# Patient Record
Sex: MALE | Race: WHITE | Employment: UNEMPLOYED | URBAN - METROPOLITAN AREA
[De-identification: names, ages, dates, MRNs, and addresses within clinical notes are randomized per-mention and may not be internally consistent; named-entity substitution may affect disease eponyms.]

---

## 2017-09-11 DIAGNOSIS — E88.81 METABOLIC SYNDROME: ICD-10-CM

## 2020-01-20 ENCOUNTER — APPOINTMENT (OUTPATIENT)
Dept: CT IMAGING | Age: 35
End: 2020-01-20
Payer: MEDICARE

## 2020-01-20 ENCOUNTER — HOSPITAL ENCOUNTER (EMERGENCY)
Age: 35
Discharge: HOME | End: 2020-01-20
Attending: EMERGENCY MEDICINE
Payer: MEDICARE

## 2020-01-20 VITALS
TEMPERATURE: 97.8 F | OXYGEN SATURATION: 98 % | HEIGHT: 65 IN | HEART RATE: 104 BPM | SYSTOLIC BLOOD PRESSURE: 148 MMHG | RESPIRATION RATE: 20 BRPM | WEIGHT: 315 LBS | BODY MASS INDEX: 52.48 KG/M2 | DIASTOLIC BLOOD PRESSURE: 77 MMHG

## 2020-01-20 LAB
A/G RATIO: 1.1 (ref 1.1–2.2)
ALBUMIN SERPL-MCNC: 3.9 G/DL (ref 3.4–5)
ALP BLD-CCNC: 67 U/L (ref 40–129)
ALT SERPL-CCNC: 30 U/L (ref 10–40)
ANION GAP SERPL CALCULATED.3IONS-SCNC: 14 MMOL/L (ref 3–16)
AST SERPL-CCNC: 25 U/L (ref 15–37)
BASOPHILS ABSOLUTE: 0.1 K/UL (ref 0–0.2)
BASOPHILS RELATIVE PERCENT: 0.4 %
BILIRUB SERPL-MCNC: 0.3 MG/DL (ref 0–1)
BILIRUBIN URINE: NEGATIVE
BLOOD, URINE: NEGATIVE
BUN BLDV-MCNC: 14 MG/DL (ref 7–20)
CALCIUM SERPL-MCNC: 9.7 MG/DL (ref 8.3–10.6)
CHLORIDE BLD-SCNC: 103 MMOL/L (ref 99–110)
CLARITY: CLEAR
CO2: 22 MMOL/L (ref 21–32)
COLOR: YELLOW
CREAT SERPL-MCNC: 0.9 MG/DL (ref 0.9–1.3)
EOSINOPHILS ABSOLUTE: 0.1 K/UL (ref 0–0.6)
EOSINOPHILS RELATIVE PERCENT: 0.6 %
GFR AFRICAN AMERICAN: >60
GFR NON-AFRICAN AMERICAN: >60
GLOBULIN: 3.6 G/DL
GLUCOSE BLD-MCNC: 126 MG/DL (ref 70–99)
GLUCOSE URINE: NEGATIVE MG/DL
HCT VFR BLD CALC: 41.2 % (ref 40.5–52.5)
HEMOGLOBIN: 14.1 G/DL (ref 13.5–17.5)
KETONES, URINE: NEGATIVE MG/DL
LEUKOCYTE ESTERASE, URINE: NEGATIVE
LIPASE: 69 U/L (ref 13–60)
LYMPHOCYTES ABSOLUTE: 1.1 K/UL (ref 1–5.1)
LYMPHOCYTES RELATIVE PERCENT: 9.5 %
MCH RBC QN AUTO: 28.6 PG (ref 26–34)
MCHC RBC AUTO-ENTMCNC: 34.2 G/DL (ref 31–36)
MCV RBC AUTO: 83.7 FL (ref 80–100)
MICROSCOPIC EXAMINATION: NORMAL
MONOCYTES ABSOLUTE: 0.9 K/UL (ref 0–1.3)
MONOCYTES RELATIVE PERCENT: 7.1 %
NEUTROPHILS ABSOLUTE: 9.9 K/UL (ref 1.7–7.7)
NEUTROPHILS RELATIVE PERCENT: 82.4 %
NITRITE, URINE: NEGATIVE
PDW BLD-RTO: 12.9 % (ref 12.4–15.4)
PH UA: 6 (ref 5–8)
PLATELET # BLD: 267 K/UL (ref 135–450)
PMV BLD AUTO: 8.5 FL (ref 5–10.5)
POTASSIUM SERPL-SCNC: 4.2 MMOL/L (ref 3.5–5.1)
PROTEIN UA: NEGATIVE MG/DL
RBC # BLD: 4.92 M/UL (ref 4.2–5.9)
SODIUM BLD-SCNC: 139 MMOL/L (ref 136–145)
SPECIFIC GRAVITY UA: >1.03 (ref 1–1.03)
TOTAL PROTEIN: 7.5 G/DL (ref 6.4–8.2)
URINE REFLEX TO CULTURE: NORMAL
URINE TYPE: NORMAL
UROBILINOGEN, URINE: 0.2 E.U./DL
WBC # BLD: 12.1 K/UL (ref 4–11)

## 2020-01-20 PROCEDURE — 80053 COMPREHEN METABOLIC PANEL: CPT

## 2020-01-20 PROCEDURE — 81003 URINALYSIS AUTO W/O SCOPE: CPT

## 2020-01-20 PROCEDURE — 99284 EMERGENCY DEPT VISIT MOD MDM: CPT

## 2020-01-20 PROCEDURE — 6360000004 HC RX CONTRAST MEDICATION: Performed by: PHYSICIAN ASSISTANT

## 2020-01-20 PROCEDURE — 72125 CT NECK SPINE W/O DYE: CPT

## 2020-01-20 PROCEDURE — 6370000000 HC RX 637 (ALT 250 FOR IP): Performed by: PHYSICIAN ASSISTANT

## 2020-01-20 PROCEDURE — 70486 CT MAXILLOFACIAL W/O DYE: CPT

## 2020-01-20 PROCEDURE — 85025 COMPLETE CBC W/AUTO DIFF WBC: CPT

## 2020-01-20 PROCEDURE — 70450 CT HEAD/BRAIN W/O DYE: CPT

## 2020-01-20 PROCEDURE — 83690 ASSAY OF LIPASE: CPT

## 2020-01-20 PROCEDURE — 2500000003 HC RX 250 WO HCPCS: Performed by: PHYSICIAN ASSISTANT

## 2020-01-20 PROCEDURE — 2580000003 HC RX 258: Performed by: PHYSICIAN ASSISTANT

## 2020-01-20 PROCEDURE — 71260 CT THORAX DX C+: CPT

## 2020-01-20 RX ORDER — ONDANSETRON 2 MG/ML
4 INJECTION INTRAMUSCULAR; INTRAVENOUS ONCE
Status: DISCONTINUED | OUTPATIENT
Start: 2020-01-20 | End: 2020-01-20

## 2020-01-20 RX ORDER — TETRACAINE HYDROCHLORIDE 5 MG/ML
1 SOLUTION OPHTHALMIC ONCE
Status: COMPLETED | OUTPATIENT
Start: 2020-01-20 | End: 2020-01-20

## 2020-01-20 RX ORDER — MORPHINE SULFATE 4 MG/ML
4 INJECTION, SOLUTION INTRAMUSCULAR; INTRAVENOUS ONCE
Status: DISCONTINUED | OUTPATIENT
Start: 2020-01-20 | End: 2020-01-20

## 2020-01-20 RX ORDER — LIDOCAINE HYDROCHLORIDE 10 MG/ML
5 INJECTION, SOLUTION INFILTRATION; PERINEURAL ONCE
Status: COMPLETED | OUTPATIENT
Start: 2020-01-20 | End: 2020-01-20

## 2020-01-20 RX ORDER — ACETAMINOPHEN 325 MG/1
650 TABLET ORAL ONCE
Status: COMPLETED | OUTPATIENT
Start: 2020-01-20 | End: 2020-01-20

## 2020-01-20 RX ORDER — ERYTHROMYCIN 5 MG/G
OINTMENT OPHTHALMIC
Qty: 1 TUBE | Refills: 0 | Status: SHIPPED | OUTPATIENT
Start: 2020-01-20 | End: 2020-01-24 | Stop reason: ALTCHOICE

## 2020-01-20 RX ORDER — 0.9 % SODIUM CHLORIDE 0.9 %
1000 INTRAVENOUS SOLUTION INTRAVENOUS ONCE
Status: COMPLETED | OUTPATIENT
Start: 2020-01-20 | End: 2020-01-20

## 2020-01-20 RX ADMIN — TETRACAINE HYDROCHLORIDE 1 DROP: 5 SOLUTION OPHTHALMIC at 20:50

## 2020-01-20 RX ADMIN — ACETAMINOPHEN 650 MG: 325 TABLET ORAL at 19:02

## 2020-01-20 RX ADMIN — FLUORESCEIN SODIUM 1 MG: 1 STRIP OPHTHALMIC at 20:50

## 2020-01-20 RX ADMIN — LIDOCAINE HYDROCHLORIDE 5 ML: 10 INJECTION, SOLUTION INFILTRATION; PERINEURAL at 20:00

## 2020-01-20 RX ADMIN — IOPAMIDOL 75 ML: 755 INJECTION, SOLUTION INTRAVENOUS at 19:55

## 2020-01-20 RX ADMIN — SODIUM CHLORIDE 1000 ML: 9 INJECTION, SOLUTION INTRAVENOUS at 19:02

## 2020-01-20 ASSESSMENT — PAIN DESCRIPTION - DESCRIPTORS
DESCRIPTORS_2: ACHING
DESCRIPTORS: ACHING

## 2020-01-20 ASSESSMENT — ENCOUNTER SYMPTOMS
BACK PAIN: 0
VOMITING: 0
SHORTNESS OF BREATH: 0
EYE PAIN: 0
ABDOMINAL PAIN: 1

## 2020-01-20 ASSESSMENT — PAIN DESCRIPTION - PROGRESSION
CLINICAL_PROGRESSION_2: NOT CHANGED
CLINICAL_PROGRESSION: NOT CHANGED

## 2020-01-20 ASSESSMENT — PAIN DESCRIPTION - INTENSITY: RATING_2: 0

## 2020-01-20 ASSESSMENT — PAIN DESCRIPTION - LOCATION
LOCATION: HEAD
LOCATION: JAW

## 2020-01-20 ASSESSMENT — PAIN SCALES - GENERAL
PAINLEVEL_OUTOF10: 0
PAINLEVEL_OUTOF10: 5
PAINLEVEL_OUTOF10: 2

## 2020-01-20 ASSESSMENT — PAIN DESCRIPTION - ONSET
ONSET_2: OTHER (COMMENT)
ONSET: ON-GOING

## 2020-01-20 ASSESSMENT — PAIN - FUNCTIONAL ASSESSMENT: PAIN_FUNCTIONAL_ASSESSMENT: 0-10

## 2020-01-20 ASSESSMENT — PAIN DESCRIPTION - FREQUENCY: FREQUENCY: CONTINUOUS

## 2020-01-20 ASSESSMENT — PAIN DESCRIPTION - PAIN TYPE: TYPE: ACUTE PAIN

## 2020-01-20 ASSESSMENT — PAIN DESCRIPTION - DURATION: DURATION_2: CONTINUOUS

## 2020-01-20 ASSESSMENT — VISUAL ACUITY
OU: 20/20
OS: 20/20
OD: 20/20

## 2020-01-20 ASSESSMENT — PAIN DESCRIPTION - ORIENTATION
ORIENTATION: RIGHT
ORIENTATION_2: POSTERIOR
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
## 2020-01-22 ENCOUNTER — TELEPHONE (OUTPATIENT)
Dept: FAMILY MEDICINE CLINIC | Age: 35
End: 2020-01-22

## 2020-01-24 ENCOUNTER — OFFICE VISIT (OUTPATIENT)
Dept: FAMILY MEDICINE CLINIC | Age: 35
End: 2020-01-24
Payer: COMMERCIAL

## 2020-01-24 VITALS
DIASTOLIC BLOOD PRESSURE: 84 MMHG | TEMPERATURE: 98.4 F | BODY MASS INDEX: 55.41 KG/M2 | OXYGEN SATURATION: 96 % | WEIGHT: 315 LBS | SYSTOLIC BLOOD PRESSURE: 145 MMHG | HEART RATE: 89 BPM | RESPIRATION RATE: 12 BRPM

## 2020-01-24 PROCEDURE — 99214 OFFICE O/P EST MOD 30 MIN: CPT | Performed by: FAMILY MEDICINE

## 2020-01-24 PROCEDURE — 96160 PT-FOCUSED HLTH RISK ASSMT: CPT | Performed by: FAMILY MEDICINE

## 2020-01-24 RX ORDER — HYDROCODONE BITARTRATE AND ACETAMINOPHEN 5; 325 MG/1; MG/1
1 TABLET ORAL EVERY 8 HOURS PRN
Qty: 9 TABLET | Refills: 0 | Status: SHIPPED | OUTPATIENT
Start: 2020-01-24 | End: 2020-01-27

## 2020-01-24 RX ORDER — PRAZOSIN HYDROCHLORIDE 1 MG/1
1 CAPSULE ORAL NIGHTLY
Qty: 30 CAPSULE | Refills: 0 | Status: SHIPPED | OUTPATIENT
Start: 2020-01-24 | End: 2020-02-13 | Stop reason: SDUPTHER

## 2020-01-24 RX ORDER — HYDROXYZINE HYDROCHLORIDE 10 MG/1
10-20 TABLET, FILM COATED ORAL 3 TIMES DAILY PRN
Qty: 30 TABLET | Refills: 0 | Status: SHIPPED | OUTPATIENT
Start: 2020-01-24 | End: 2020-01-31 | Stop reason: SDUPTHER

## 2020-01-24 ASSESSMENT — PATIENT HEALTH QUESTIONNAIRE - PHQ9
6. FEELING BAD ABOUT YOURSELF - OR THAT YOU ARE A FAILURE OR HAVE LET YOURSELF OR YOUR FAMILY DOWN: 3
5. POOR APPETITE OR OVEREATING: 3
SUM OF ALL RESPONSES TO PHQ QUESTIONS 1-9: 19
SUM OF ALL RESPONSES TO PHQ QUESTIONS 1-9: 19
1. LITTLE INTEREST OR PLEASURE IN DOING THINGS: 3
10. IF YOU CHECKED OFF ANY PROBLEMS, HOW DIFFICULT HAVE THESE PROBLEMS MADE IT FOR YOU TO DO YOUR WORK, TAKE CARE OF THINGS AT HOME, OR GET ALONG WITH OTHER PEOPLE: 2
4. FEELING TIRED OR HAVING LITTLE ENERGY: 3
7. TROUBLE CONCENTRATING ON THINGS, SUCH AS READING THE NEWSPAPER OR WATCHING TELEVISION: 3
SUM OF ALL RESPONSES TO PHQ9 QUESTIONS 1 & 2: 6
9. THOUGHTS THAT YOU WOULD BE BETTER OFF DEAD, OR OF HURTING YOURSELF: 1
8. MOVING OR SPEAKING SO SLOWLY THAT OTHER PEOPLE COULD HAVE NOTICED. OR THE OPPOSITE, BEING SO FIGETY OR RESTLESS THAT YOU HAVE BEEN MOVING AROUND A LOT MORE THAN USUAL: 3
2. FEELING DOWN, DEPRESSED OR HOPELESS: 3
3. TROUBLE FALLING OR STAYING ASLEEP: 3
SUM OF ALL RESPONSES TO PHQ QUESTIONS 1-9: 6
SUM OF ALL RESPONSES TO PHQ QUESTIONS 1-9: 6
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
## 2020-01-31 ENCOUNTER — TELEPHONE (OUTPATIENT)
Dept: PSYCHOLOGY | Age: 35
End: 2020-01-31

## 2020-01-31 ENCOUNTER — OFFICE VISIT (OUTPATIENT)
Dept: FAMILY MEDICINE CLINIC | Age: 35
End: 2020-01-31
Payer: COMMERCIAL

## 2020-01-31 VITALS
HEART RATE: 80 BPM | OXYGEN SATURATION: 97 % | DIASTOLIC BLOOD PRESSURE: 68 MMHG | RESPIRATION RATE: 16 BRPM | BODY MASS INDEX: 56.08 KG/M2 | TEMPERATURE: 97.2 F | WEIGHT: 315 LBS | SYSTOLIC BLOOD PRESSURE: 126 MMHG

## 2020-01-31 DIAGNOSIS — Z11.59 NEED FOR HEPATITIS C SCREENING TEST: ICD-10-CM

## 2020-01-31 DIAGNOSIS — Z11.4 ENCOUNTER FOR SCREENING FOR HIV: ICD-10-CM

## 2020-01-31 LAB
HAV IGM SER IA-ACNC: NORMAL
HEPATITIS B CORE IGM ANTIBODY: NORMAL
HEPATITIS B SURFACE ANTIGEN INTERPRETATION: NORMAL
HEPATITIS C ANTIBODY INTERPRETATION: NORMAL

## 2020-01-31 PROCEDURE — 99214 OFFICE O/P EST MOD 30 MIN: CPT | Performed by: FAMILY MEDICINE

## 2020-01-31 RX ORDER — HYDROXYZINE HYDROCHLORIDE 10 MG/1
10-20 TABLET, FILM COATED ORAL 3 TIMES DAILY PRN
Qty: 60 TABLET | Refills: 0 | Status: SHIPPED | OUTPATIENT
Start: 2020-01-31 | End: 2020-02-10

## 2020-01-31 RX ORDER — CEPHALEXIN 500 MG/1
500 CAPSULE ORAL 2 TIMES DAILY
Qty: 14 CAPSULE | Refills: 0 | Status: SHIPPED | OUTPATIENT
Start: 2020-01-31 | End: 2020-02-14 | Stop reason: ALTCHOICE

## 2020-01-31 NOTE — TELEPHONE ENCOUNTER
Patient is scheduled to see me for a new patient visit on Monday, 2/3. There is a note for the visit that says he prefers early morning appointments. My 8:30 and 9 appointment slots are available. Please call patient to offer one of them to him. Thanks.

## 2020-01-31 NOTE — PATIENT INSTRUCTIONS
1) Increase the bedtime/nighttime prazosin to 2 capsules. Call with update how you are doing with the anxiety and sleep in about 1 week. 2) Start Keflex antibiotics if left elbow redness or pain worsens. 3) Check the cost of the MRI.
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
## 2020-02-01 LAB
HIV AG/AB: NORMAL
HIV ANTIGEN: NORMAL
HIV-1 ANTIBODY: NORMAL
HIV-2 AB: NORMAL

## 2020-02-03 ENCOUNTER — OFFICE VISIT (OUTPATIENT)
Dept: PSYCHOLOGY | Age: 35
End: 2020-02-03
Payer: COMMERCIAL

## 2020-02-03 PROCEDURE — 90791 PSYCH DIAGNOSTIC EVALUATION: CPT | Performed by: PSYCHOLOGIST

## 2020-02-03 ASSESSMENT — PATIENT HEALTH QUESTIONNAIRE - PHQ9
1. LITTLE INTEREST OR PLEASURE IN DOING THINGS: 2
7. TROUBLE CONCENTRATING ON THINGS, SUCH AS READING THE NEWSPAPER OR WATCHING TELEVISION: 3
3. TROUBLE FALLING OR STAYING ASLEEP: 1
6. FEELING BAD ABOUT YOURSELF - OR THAT YOU ARE A FAILURE OR HAVE LET YOURSELF OR YOUR FAMILY DOWN: 2
SUM OF ALL RESPONSES TO PHQ QUESTIONS 1-9: 17
SUM OF ALL RESPONSES TO PHQ QUESTIONS 1-9: 17
SUM OF ALL RESPONSES TO PHQ9 QUESTIONS 1 & 2: 4
5. POOR APPETITE OR OVEREATING: 2
8. MOVING OR SPEAKING SO SLOWLY THAT OTHER PEOPLE COULD HAVE NOTICED. OR THE OPPOSITE, BEING SO FIGETY OR RESTLESS THAT YOU HAVE BEEN MOVING AROUND A LOT MORE THAN USUAL: 3
4. FEELING TIRED OR HAVING LITTLE ENERGY: 2
2. FEELING DOWN, DEPRESSED OR HOPELESS: 2
9. THOUGHTS THAT YOU WOULD BE BETTER OFF DEAD, OR OF HURTING YOURSELF: 0

## 2020-02-03 ASSESSMENT — ANXIETY QUESTIONNAIRES
3. WORRYING TOO MUCH ABOUT DIFFERENT THINGS: 3-NEARLY EVERY DAY
2. NOT BEING ABLE TO STOP OR CONTROL WORRYING: 3-NEARLY EVERY DAY
GAD7 TOTAL SCORE: 20
5. BEING SO RESTLESS THAT IT IS HARD TO SIT STILL: 2-OVER HALF THE DAYS
1. FEELING NERVOUS, ANXIOUS, OR ON EDGE: 3-NEARLY EVERY DAY
4. TROUBLE RELAXING: 3-NEARLY EVERY DAY
7. FEELING AFRAID AS IF SOMETHING AWFUL MIGHT HAPPEN: 3-NEARLY EVERY DAY
6. BECOMING EASILY ANNOYED OR IRRITABLE: 3-NEARLY EVERY DAY
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
## 2020-02-03 NOTE — PATIENT INSTRUCTIONS
Goals: 1. Allow yourself to process the memories of your trauma without avoiding triggers  2. Take your dog for walks to manage stress. When the anxiety sets in, focus on diaphragmatic breathing and grounding. 3. Practice being mindful - paying attention to the present moment on purpose and in a nonjudgmental way        MINDFULNESS    Mindfulness means paying attention in a particular way: on purpose, in the present moment, and non-judgmentally. Gilberto Pollard    \"Mindfulness is the basic human ability to be fully present, aware of where we are and what were doing, and not overly reactive or overwhelmed by whats going on around us. \"   -Mindful Shidler    Paying attention on purpose  Mindfulness involves paying attention on purpose. Mindfulness involves a conscious direction of our awareness. This can mean purposefully directing our attention to the breath, or a particular emotion, or an activity as simple as eating. Doing so allows us to actively shape the mind. Paying attention in the present moment  Left to itself, the mind wanders through all kinds of thoughts -- including thoughts expressing anger, craving, depression, self-pity, and anxiety. As we indulge in these kinds of thoughts, we reinforce those emotions and cause ourselves to suffer. These thoughts usually center around the past or future. But the past no longer exists. The future is just a fantasy until it happens. The one moment we actually can experience -- the present moment -- is the one we seem most to avoid. By purposefully directing our awareness away from thoughts about the past or future and instead towards the [address] - our present moment experience - we decrease the effect of these thoughts on our lives. Paying attention non-judgmentally  Mindfulness is an emotionally non-reactive state. We don't  that this experience is good and that one is bad.  Or, if we do make those judgments, we dont get upset in reaction to our experience. We simply notice it arising, observe it mindfully, and allow it to pass through us. When practicing mindfulness, we may be aware that certain experiences are pleasant and some are unpleasant, but on an emotional level we dont react. Resources  · \"Wherever You Go, There You Are: Mindfulness Meditation in Everyday Life\" - by Ramon Blank  · \"The Miracle of Mindfulness: An Introduction to the Practice of Meditation\" - by Fidelia Moser  · \"The Power of Now: A Guide to Spiritual Enlightenment\" - by Emilie Mcclain  · \"The Mindfulness Solution: Everyday Practices for Everyday Problems\" - by Harvis Heimlich    Ways to Practice Mindfulness  · Pay attention to your breathing  · Take a mindful walk  · Eat mindfully  · Ground yourself in your five senses  · Journal  · Try dishwashing, cleaning and doing laundry a little bit slower than you usually do  · Meditate        Diaphragmatic Breathing    \"The entire autonomic nervous system (and through it, our internal organs and glands) is largely driven by our breathing patterns. By changing our breathing we can influence millions of biochemical reactions in our body, producing more relaxing substances such as endorphins and fewer anxiety-producing ones like adrenaline and higher blood acidity. Mindfulness of the breath is so effective that it is common to all meditative and prayer traditions. \" Anxiety Fear & Breathing - Breathing. com    \"When overcoming high levels of anxiety, it is important to learn the techniques of correct breathing. Many people who live with high levels of anxiety are known to breathe through their chest. Shallow breathing through the chest means you are disrupting the balance of oxygen and carbon dioxide necessary to be in a relaxed state. This type of breathing will perpetuate the symptoms of anxiety. \" HealthyPlace. com      What Is Diaphragmatic Breathing?    Diaphragmatic breathing is a technique that helps you slow down your

## 2020-02-12 NOTE — TELEPHONE ENCOUNTER
Refill is being requested for:  --prazosin (MINIPRESS) 1 MG capsule    (Rationale: Pt states already has appt scheduled for 2/14/20, but is out of medication now. Hoping to have e-scribed to hold until appt.)    Last OV with PCP on:  --1/31/20    Last Labs on:  --1/20/20    Requested Pharmacy:  --[facility] #[id number], [address] [place] [address]. PIETRO Batista Memos [phone] - F [phone]    Does PCP feel refill is appropriate? Pt is requesting return call for update to [phone] when possible. Thank you!

## 2020-02-13 ENCOUNTER — TELEPHONE (OUTPATIENT)
Dept: FAMILY MEDICINE CLINIC | Age: 35
End: 2020-02-13

## 2020-02-13 RX ORDER — PRAZOSIN HYDROCHLORIDE 1 MG/1
1 CAPSULE ORAL NIGHTLY
Qty: 30 CAPSULE | Refills: 0 | Status: SHIPPED | OUTPATIENT
Start: 2020-02-13 | End: 2020-02-14

## 2020-02-14 ENCOUNTER — OFFICE VISIT (OUTPATIENT)
Dept: FAMILY MEDICINE CLINIC | Age: 35
End: 2020-02-14
Payer: COMMERCIAL

## 2020-02-14 VITALS
HEIGHT: 65 IN | DIASTOLIC BLOOD PRESSURE: 62 MMHG | HEART RATE: 78 BPM | RESPIRATION RATE: 16 BRPM | WEIGHT: 315 LBS | SYSTOLIC BLOOD PRESSURE: 122 MMHG | BODY MASS INDEX: 52.48 KG/M2 | OXYGEN SATURATION: 98 %

## 2020-02-14 PROCEDURE — 99214 OFFICE O/P EST MOD 30 MIN: CPT | Performed by: FAMILY MEDICINE

## 2020-02-14 RX ORDER — PRAZOSIN HYDROCHLORIDE 2 MG/1
4 CAPSULE ORAL NIGHTLY
Qty: 60 CAPSULE | Refills: 3 | Status: SHIPPED | OUTPATIENT
Start: 2020-02-14 | End: 2020-06-30

## 2020-02-14 RX ORDER — ESCITALOPRAM OXALATE 10 MG/1
10 TABLET ORAL DAILY
Qty: 30 TABLET | Refills: 5 | Status: SHIPPED | OUTPATIENT
Start: 2020-02-14 | End: 2020-03-03
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
## 2020-02-19 ENCOUNTER — PROCEDURE VISIT (OUTPATIENT)
Dept: AUDIOLOGY | Age: 35
End: 2020-02-19
Payer: MEDICARE

## 2020-02-19 PROBLEM — H93.13 TINNITUS, BILATERAL: Status: ACTIVE | Noted: 2020-02-19

## 2020-02-19 PROCEDURE — 92567 TYMPANOMETRY: CPT | Performed by: AUDIOLOGIST

## 2020-02-19 PROCEDURE — 92557 COMPREHENSIVE HEARING TEST: CPT | Performed by: AUDIOLOGIST

## 2020-02-19 NOTE — PATIENT INSTRUCTIONS
confusion or trouble understanding simple statements. ? Sudden problems with walking or balance. ? A sudden, severe headache that is different from past headaches. Call your doctor now or seek immediate medical care if:    · You develop other symptoms. These may include hearing loss (or worse hearing loss), balance problems, dizziness, nausea, or vomiting. Watch closely for changes in your health, and be sure to contact your doctor if:    · Your tinnitus moves from both ears to one ear. · Your hearing loss gets worse within 1 day after an ear injury. · Your tinnitus or hearing loss does not get better within 1 week after an ear injury. · Your tinnitus bothers you enough that you want to take medicines to help you cope with it. If you notice changes in your tinnitus and/or your hearing, it is recommended that you have your hearing tested by your audiologist and to follow-up with your physician that manages your hearing loss (such as your ENT or Primary Care doctor). Good Communication Strategies    Communication can be challenging for anyone, but can be especially difficult for those with some degree of hearing loss. While we may not be able to control every factor that may lead to difficulty with communication, there are Good Communication Strategies that we can all use in our day-to-day lives. Communication takes both parties working together for it to be successful. Tips as a Listener:   1. Control your environment. It is important to limit the amount of background noise in the room when possible. You should also consider having a good light source in the room to best see the other person. 2. Ask for clarification. Instead of saying \"What?\", you can use parts of what you heard to make a new question. For example, if you heard the word \"Thursday\" but not the rest of the week, you may ask \"What was that about Thursday? \" or \"What did you want to do Thursday? \".   This shows the be especially helpful when watching programs with accented speech. -- Consider use of a sound bar or speakers that come from the front of the TV. With modern flat screen TVs, many of them have speakers that come out of the back of the device, which makes sound bounce off the wall behind it, then go into the room. Sound bars can allow the sound to go straight in your direction and can improve sound quality. -- Consider ear level devices to help improve the volume and/or sound quality of the program.  There are devices that work like headphones that you can adjust the volume for your ears while others can have the volume at a more comfortable level, such as \"TV Ears\". Most hearing aids have devices that allow them to connect directly to the TV and improve sound quality. Noise-Induced Hearing Loss  What it is, and what you can do to prevent it    Exposure to loud sounds, in an occupational setting or recreational, can cause permanent hearing loss. Sound is measured in decibels (dB). Noise-induced hearing loss is the ONLY type of preventable hearing loss. Hearing loss related to noise exposure can occur at any age. There are small sensory cells, called inner and outer hair cells, within the inner ear (cochlea). These cells process the loudness (intensity) and pitch (frequency) of sound and send the signal to the brain via our auditory nerve (vestibulocochlear nerve, cranial nerve VIII). When these cells are damaged, they can result in permanent hearing loss and/or tinnitus. The hair cells responsible for high frequency sounds, like birds chirping, are most likely to be damaged due to loud sounds. The high frequency sounds are also very important for our clarity and understanding of speech. OCCUPATIONAL NOISE EXPOSURE RECREATIONAL NOISE EXPOSURE   Some jobs may have exposure to loud sounds in the workplace.   These jobs may include but are not limited to:  [employer] settings   Manufacturing   Construction   Welding   Landscaping   Hairdressing/hairstyling   Musicians  [employer]    ... And more! Many activities outside of work may cause permanent hearing loss. These activities may include but are not limited to:  Lawnmowers, leaf blowers  [employer] (such as pigs squealing)   Chainsaws and other power tools  [place] musical instruments and/or singing   Listening to music too loudly - at concerts, through stereo, through ear buds or headphones   Attending sporting events   Attending fireworks shows or using fireworks at home  [employer] of firearms   . .. And more! REDUCE OR PROTECT YOUR EARS FROM NOISE EXPOSURE    To do your best to avoid noise-induced hearing loss, here are some tips:   Limit exposure to loud sounds. 85 dB (decibels) is safe for 8 hours. As sounds are louder, the length of time the sound is safe lessens. These numbers are cumulative across a 24-hour period. (NIOSH and CDC, 2002)  o 85 dB is safe for 8 hours  o 88 dB is safe for 4 hours  o 91 dB is safe for 2 hours  o 94 dB is safe for 1 hour  o 97 dB is safe for 30 minutes  o 100 dB is safe for 15 minutes  o 103 dB is safe for 7.5 minutes  o 106 dB is safe for 3.75 minutes  o 109 dB is safe for LESS THAN 2 minutes  o 112 dB is safe for LESS THAN 1 minute  o 115 dB is safe for ~ 30 seconds  o 130 dB can cause IMMEDIATE hearing loss   If you are unsure if a sound is too loud, consider checking the sound level with a \"sound level meter\". There are apps on smart devices, such as \"Decibel X\", that can measure the loudness of the sound. They are not as accurate as expensive equipment used by scientists, but it will give you a guesstimate of how loud the sound is, and if it may be damaging to your hearing.  If you cannot avoid loud sounds, here are ways to reduce your exposure:  o 1.  Wear hearing protection  - Ear plugs and protective ear muffs can be used to reduce the intensity of the sound. The higher the NRR (noise reduction rating), the better reduction of the intensity of the sound   o 2. Turn the volume down  - When listening to music, turn the volume down, especially when wearing ear buds or headphones. A good rule of thumb is to not go beyond the middle setting on your device. If you can't hear someone talking to you from arm's length away, your music may be at a level that it can cause damage. If someone else can hear your music from 3 feet away, it may also be at a level that it can cause damage. o 3. Walk away from the sound  - If you do not have the ability to wear hearing protection or turn down the volume of the sound, you should do your best to move away from the source of the sound. - Sound decreases in intensity as we move further from the source. The sound will decrease by 6 dB for every doubling of distance from the sound source. TYPES OF HEARING PROTECTION    The most common types of hearing protection are protective ear muffs and ear plugs. Protective ear muffs are commonly found at home improvement or sporting good stores, they can be worn time and time again and are great if you need to take your hearing protection off frequently. Ear plugs are often made of foam or soft silicone. The foam ones are designed for one-time use, while silicone ear plugs may be used multiple times. There are also \"filtered\" ear plugs that help provide even attenuation of the sound across all frequencies. These are great for listening to music or going to concerts, and allow for better understanding of speech in louder environments. They can be purchased at music stores or online retailers (search \"Ety Plugs\" or \"filtered ear plugs\"), or custom earmolds can be made with an audiologist.    There are \"custom\" hearing protection devices that you can further discuss with your audiologist based on your specific needs, if desired.         Exposure to these sounds may cause permanent damage to your hearing.   If you suspect your hearing has changed, it is recommended that you have your hearing tested by your audiologist.

## 2020-02-19 NOTE — Clinical Note
Dr. Isac Rios you for your referral for audiometric testing on this patient. Today's results are consistent with hearing sensitivity within normal limits with normal middle ear function in both ears. Discussed tinnitus management strategies. If you have any questions, or if there is anything else you need, please let me know. Teddy Amin [id number] [facility] - Audiology

## 2020-02-27 ENCOUNTER — OFFICE VISIT (OUTPATIENT)
Dept: PSYCHOLOGY | Age: 35
End: 2020-02-27
Payer: MEDICARE

## 2020-02-27 PROCEDURE — 90832 PSYTX W PT 30 MINUTES: CPT | Performed by: PSYCHOLOGIST

## 2020-02-27 ASSESSMENT — ANXIETY QUESTIONNAIRES
5. BEING SO RESTLESS THAT IT IS HARD TO SIT STILL: 1-SEVERAL DAYS
1. FEELING NERVOUS, ANXIOUS, OR ON EDGE: 2-OVER HALF THE DAYS
6. BECOMING EASILY ANNOYED OR IRRITABLE: 3-NEARLY EVERY DAY
2. NOT BEING ABLE TO STOP OR CONTROL WORRYING: 2-OVER HALF THE DAYS
3. WORRYING TOO MUCH ABOUT DIFFERENT THINGS: 2-OVER HALF THE DAYS
GAD7 TOTAL SCORE: 15
7. FEELING AFRAID AS IF SOMETHING AWFUL MIGHT HAPPEN: 3-NEARLY EVERY DAY
4. TROUBLE RELAXING: 2-OVER HALF THE DAYS

## 2020-02-27 ASSESSMENT — PATIENT HEALTH QUESTIONNAIRE - PHQ9
9. THOUGHTS THAT YOU WOULD BE BETTER OFF DEAD, OR OF HURTING YOURSELF: 1
7. TROUBLE CONCENTRATING ON THINGS, SUCH AS READING THE NEWSPAPER OR WATCHING TELEVISION: 2
6. FEELING BAD ABOUT YOURSELF - OR THAT YOU ARE A FAILURE OR HAVE LET YOURSELF OR YOUR FAMILY DOWN: 2
SUM OF ALL RESPONSES TO PHQ QUESTIONS 1-9: 16
8. MOVING OR SPEAKING SO SLOWLY THAT OTHER PEOPLE COULD HAVE NOTICED. OR THE OPPOSITE, BEING SO FIGETY OR RESTLESS THAT YOU HAVE BEEN MOVING AROUND A LOT MORE THAN USUAL: 1
SUM OF ALL RESPONSES TO PHQ QUESTIONS 1-9: 16
1. LITTLE INTEREST OR PLEASURE IN DOING THINGS: 2
5. POOR APPETITE OR OVEREATING: 2
2. FEELING DOWN, DEPRESSED OR HOPELESS: 2
3. TROUBLE FALLING OR STAYING ASLEEP: 2
4. FEELING TIRED OR HAVING LITTLE ENERGY: 2
SUM OF ALL RESPONSES TO PHQ9 QUESTIONS 1 & 2: 4

## 2020-02-27 NOTE — PROGRESS NOTES
Behavioral Health Consultation  Jim Romero Psy.D. Psychologist  2/27/2020  9-9:30 AM      Time spent with Patient: 30 minutes  This is patient's second [facility] appointment. Reason for Consult: Anxiety from a recent trauma  Referring Provider: DO Vanesa Winters [address] 89      S:  Pt reported that he's been up and down. Had a couple weeks when he was feeling better. The last 2 weeks have been harder as he's had to deal with court issues stemming from his trauma. Feeling angry about the situation. Unable to concentrate on playing piano, not finding it enjoyable. Monday nights are particularly difficult because that is when the trauma happened. Going for walks, which are less satisfying than they used to be. Feels supported by loved ones, but also feels sad when he thinks about the support he has. He no longer feels triggered by police cars, which he does view as improvement. Tried Lexapro, ended up fainting overnight. No longer feels the need to take hydroxyzine; dislikes how foggy he feels. Taking prazosin 2mg before bed. The 4mg dose makes him feel foggy in the morning. No longer having night terrors, but bad dreams about his trauma cause him to start his day off poorly. He continues to experience ringing in his ears and blurry vision stemming from the concussion he suffered. Occasional fleeting thoughts that he'd be better off not being here than have to deal with this. Feels very angry at the whole institution because of what happened to him. Denied HI.      O:  Patient continues to experience symptoms of PTSD, and now meets criteria for this disorder because it has been more than 30 days since his trauma occurred. Although he did report feeling better for a short time, he continues to struggle with bad dreams about past trauma that are triggered in part by his need to spend time in court. Normalized and validated his distress.   Discussed strategies to help him prepare for sleep in calm ways and shift his focus to more positive activities when he wakes up in the morning. Recommended patient seek trauma-focused psychotherapy in the community at this time to help him work address PTSD symptoms, especially as he continues to spend time in court. He was in agreement. Will continue to bridge treatment until patient gets to connected to a community therapist.  Patient noted vague and passive thoughts of not wanting to deal with his current situation, but he adamantly denied SI/HI, plan or intent. Safe for outpatient level of care at this time. A:  MSE:  Appearance: good hygiene  and appropriate attire  Attitude: cooperative, friendly and mild to moderate distress  Consciousness: alert  Orientation: oriented to person, place, time, general circumstance  Memory: recent and remote memory intact  Attention/Concentration: intact during session  Psychomotor Activity: normal  Eye Contact: normal  Speech: normal rate and volume, well-articulated  Mood: anxious, dysphoric  Affect: congruent  Perception: within normal limits  Thought Content: within normal limits  Thought Process: logical, coherent and goal-directed  Insight: good  Judgment: intact  Ability to understand instructions: Yes  Ability to respond meaningfully: Yes  Morbid Ideation: passive thoughts of death  Suicide Assessment: no suicidal ideation, plan, or intent  Homicidal Ideation: no      DEBORA 7 SCORE 2/27/2020 2/3/2020   DEBORA-7 Total Score 15 20     Interpretation of DEBORA-7 score: 5-9 = mild anxiety, 10-14 = moderate anxiety, 15+ = severe anxiety. Recommend referral to behavioral health for scores 10 or greater.     PHQ Scores 2/27/2020 2/3/2020 1/24/2020 1/24/2020   PHQ2 Score 4 4 - 6   PHQ9 Score 16 17 19 6     Interpretation of Total Score Depression Severity: 1-4 = Minimal depression, 5-9 = Mild depression, 10-14 = Moderate depression, 15-19 = Moderately severe depression, 20-27 = Severe

## 2020-03-03 ENCOUNTER — OFFICE VISIT (OUTPATIENT)
Dept: FAMILY MEDICINE CLINIC | Age: 35
End: 2020-03-03
Payer: MEDICARE

## 2020-03-03 VITALS
TEMPERATURE: 98.2 F | OXYGEN SATURATION: 95 % | RESPIRATION RATE: 16 BRPM | DIASTOLIC BLOOD PRESSURE: 71 MMHG | HEART RATE: 90 BPM | BODY MASS INDEX: 55.25 KG/M2 | SYSTOLIC BLOOD PRESSURE: 113 MMHG | WEIGHT: 315 LBS

## 2020-03-03 PROCEDURE — G8427 DOCREV CUR MEDS BY ELIG CLIN: HCPCS | Performed by: FAMILY MEDICINE

## 2020-03-03 PROCEDURE — G8417 CALC BMI ABV UP PARAM F/U: HCPCS | Performed by: FAMILY MEDICINE

## 2020-03-03 PROCEDURE — 90471 IMMUNIZATION ADMIN: CPT | Performed by: FAMILY MEDICINE

## 2020-03-03 PROCEDURE — 1036F TOBACCO NON-USER: CPT | Performed by: FAMILY MEDICINE

## 2020-03-03 PROCEDURE — 99214 OFFICE O/P EST MOD 30 MIN: CPT | Performed by: FAMILY MEDICINE

## 2020-03-03 PROCEDURE — 90715 TDAP VACCINE 7 YRS/> IM: CPT | Performed by: FAMILY MEDICINE

## 2020-03-03 PROCEDURE — G8484 FLU IMMUNIZE NO ADMIN: HCPCS | Performed by: FAMILY MEDICINE

## 2020-03-03 RX ORDER — VENLAFAXINE HYDROCHLORIDE 75 MG/1
75 CAPSULE, EXTENDED RELEASE ORAL DAILY
Qty: 30 CAPSULE | Refills: 3 | Status: SHIPPED | OUTPATIENT
Start: 2020-03-03 | End: 2020-07-15

## 2020-03-03 NOTE — PROGRESS NOTES
history on file. Family History   Problem Relation Age of Onset    Diabetes Mother     Hypertension Sister      Social History     Socioeconomic History    Marital status: Single     Spouse name: Not on file    Number of children: Not on file    Years of education: Not on file    Highest education level: Not on file   Occupational History    Not on file   Social Needs    Financial resource strain: Not on file    Food insecurity:     Worry: Not on file     Inability: Not on file    Transportation needs:     Medical: Not on file     Non-medical: Not on file   Tobacco Use    Smoking status: Never Smoker    Smokeless tobacco: Never Used   Substance and Sexual Activity    Alcohol use: Not on file    Drug use: Not on file    Sexual activity: Not on file   Lifestyle    Physical activity:     Days per week: Not on file     Minutes per session: Not on file    Stress: Not on file   Relationships    Social connections:     Talks on phone: Not on file     Gets together: Not on file     Attends Latter day service: Not on file     Active member of club or organization: Not on file     Attends meetings of clubs or organizations: Not on file     Relationship status: Not on file    Intimate partner violence:     Fear of current or ex partner: Not on file     Emotionally abused: Not on file     Physically abused: Not on file     Forced sexual activity: Not on file   Other Topics Concern    Not on file   Social History Narrative    Not on file       O: /71   Pulse 90   Temp 98.2 °F (36.8 °C) (Oral)   Resp 16   Wt (!) 332 lb (150.6 kg)   SpO2 95%   Breastfeeding No   BMI 55.25 kg/m²   Physical Exam  GEN: No acute distress, cooperative, well nourished, alert. HEENT: PEERLA, EOMI , normocephalic/atraumatic, nares and oropharynx clear. Mucus membranes normal, Tympanic membranes clear bilaterally.     Neck: soft, supple, no thyromegaly, mass, no Lymphadenopathy  CV: Regular rate and rhythm, no murmur,

## 2020-03-03 NOTE — PATIENT INSTRUCTIONS
1) Consider contacting [facility] to see if testing is free or inexpensive. Contact this clinic once you have an answer.   [phone]

## 2020-03-11 ENCOUNTER — OFFICE VISIT (OUTPATIENT)
Dept: PSYCHOLOGY | Age: 35
End: 2020-03-11
Payer: MEDICARE

## 2020-03-11 ENCOUNTER — OFFICE VISIT (OUTPATIENT)
Dept: FAMILY MEDICINE CLINIC | Age: 35
End: 2020-03-11
Payer: MEDICARE

## 2020-03-11 VITALS
SYSTOLIC BLOOD PRESSURE: 120 MMHG | HEART RATE: 73 BPM | BODY MASS INDEX: 55.25 KG/M2 | DIASTOLIC BLOOD PRESSURE: 65 MMHG | RESPIRATION RATE: 16 BRPM | OXYGEN SATURATION: 95 % | WEIGHT: 315 LBS | TEMPERATURE: 97.7 F

## 2020-03-11 PROCEDURE — G8484 FLU IMMUNIZE NO ADMIN: HCPCS | Performed by: FAMILY MEDICINE

## 2020-03-11 PROCEDURE — 90832 PSYTX W PT 30 MINUTES: CPT | Performed by: PSYCHOLOGIST

## 2020-03-11 PROCEDURE — 1036F TOBACCO NON-USER: CPT | Performed by: FAMILY MEDICINE

## 2020-03-11 PROCEDURE — 99213 OFFICE O/P EST LOW 20 MIN: CPT | Performed by: FAMILY MEDICINE

## 2020-03-11 PROCEDURE — G8417 CALC BMI ABV UP PARAM F/U: HCPCS | Performed by: FAMILY MEDICINE

## 2020-03-11 PROCEDURE — G8427 DOCREV CUR MEDS BY ELIG CLIN: HCPCS | Performed by: FAMILY MEDICINE

## 2020-03-11 ASSESSMENT — ANXIETY QUESTIONNAIRES
2. NOT BEING ABLE TO STOP OR CONTROL WORRYING: 1-SEVERAL DAYS
GAD7 TOTAL SCORE: 12
5. BEING SO RESTLESS THAT IT IS HARD TO SIT STILL: 2-OVER HALF THE DAYS
6. BECOMING EASILY ANNOYED OR IRRITABLE: 2-OVER HALF THE DAYS
3. WORRYING TOO MUCH ABOUT DIFFERENT THINGS: 2-OVER HALF THE DAYS
4. TROUBLE RELAXING: 2-OVER HALF THE DAYS
7. FEELING AFRAID AS IF SOMETHING AWFUL MIGHT HAPPEN: 1-SEVERAL DAYS
1. FEELING NERVOUS, ANXIOUS, OR ON EDGE: 2-OVER HALF THE DAYS

## 2020-03-11 ASSESSMENT — PATIENT HEALTH QUESTIONNAIRE - PHQ9
3. TROUBLE FALLING OR STAYING ASLEEP: 3
1. LITTLE INTEREST OR PLEASURE IN DOING THINGS: 3
4. FEELING TIRED OR HAVING LITTLE ENERGY: 3
SUM OF ALL RESPONSES TO PHQ QUESTIONS 1-9: 19
SUM OF ALL RESPONSES TO PHQ9 QUESTIONS 1 & 2: 4
7. TROUBLE CONCENTRATING ON THINGS, SUCH AS READING THE NEWSPAPER OR WATCHING TELEVISION: 3
8. MOVING OR SPEAKING SO SLOWLY THAT OTHER PEOPLE COULD HAVE NOTICED. OR THE OPPOSITE, BEING SO FIGETY OR RESTLESS THAT YOU HAVE BEEN MOVING AROUND A LOT MORE THAN USUAL: 2
2. FEELING DOWN, DEPRESSED OR HOPELESS: 1
9. THOUGHTS THAT YOU WOULD BE BETTER OFF DEAD, OR OF HURTING YOURSELF: 1
5. POOR APPETITE OR OVEREATING: 2
SUM OF ALL RESPONSES TO PHQ QUESTIONS 1-9: 19
6. FEELING BAD ABOUT YOURSELF - OR THAT YOU ARE A FAILURE OR HAVE LET YOURSELF OR YOUR FAMILY DOWN: 1

## 2020-03-11 NOTE — PROGRESS NOTES
 Marital status: Single     Spouse name: Not on file    Number of children: Not on file    Years of education: Not on file    Highest education level: Not on file   Occupational History    Not on file   Social Needs    Financial resource strain: Not on file    Food insecurity     Worry: Not on file     Inability: Not on file    Transportation needs     Medical: Not on file     Non-medical: Not on file   Tobacco Use    Smoking status: Never Smoker    Smokeless tobacco: Never Used   Substance and Sexual Activity    Alcohol use: Not on file    Drug use: Not on file    Sexual activity: Not on file   Lifestyle    Physical activity     Days per week: Not on file     Minutes per session: Not on file    Stress: Not on file   Relationships    Social connections     Talks on phone: Not on file     Gets together: Not on file     Attends Rastafari service: Not on file     Active member of club or organization: Not on file     Attends meetings of clubs or organizations: Not on file     Relationship status: Not on file    Intimate partner violence     Fear of current or ex partner: Not on file     Emotionally abused: Not on file     Physically abused: Not on file     Forced sexual activity: Not on file   Other Topics Concern    Not on file   Social History Narrative    Not on file       O: /65   Pulse 73   Temp 97.7 °F (36.5 °C) (Oral)   Resp 16   Wt (!) 332 lb (150.6 kg)   SpO2 95%   Breastfeeding No   BMI 55.25 kg/m²   Physical Exam  GEN: No acute distress, cooperative, well nourished, alert. HEENT: PEERLA, EOMI , normocephalic/atraumatic, nares and oropharynx clear. Mucus membranes normal, Tympanic membranes clear bilaterally. Neck: soft, supple, no thyromegaly, mass, no Lymphadenopathy  CV: Regular rate and rhythm, no murmur, rubs, gallops. No edema. Resp: Clear to auscultation bilaterally good air entry bilaterally  No crackles, wheeze. Breathing comfortably. Psych: normal affect. Denies SI/HI  Neuro: AOx3      ASSESSMENT   Diagnosis Orders   1. PTSD (post-traumatic stress disorder)       The current medical regimen is effective;  continue present plan and medications. PLAN          If applicable, see additional patient information and instructions under \"Patient Instructions. \"    Return in about 3 weeks (around 4/1/2020). Patient Instructions   1) Call with update on how you are doing with Venlafaxine sometime next week. 2) Keep appt with Lazarus Delay for now; unless you get an appt with someone else. 3) f/u 2 to 4 weeks. Patient Education        venlafaxine  Pronunciation:  BRITTANI la fax een  Brand:  Effexor XR  What is the most important information I should know about venlafaxine? Do not use venlafaxine within 7 days before or 14 days after you have used an MAO inhibitor, such as isocarboxazid, linezolid, methylene blue injection, phenelzine, rasagiline, selegiline, or tranylcypromine. Some young people have thoughts about suicide when first taking an antidepressant. Stay alert to changes in your mood or symptoms. Report any new or worsening symptoms to your doctor. Do not stop using venlafaxine without first talking to your doctor. What is venlafaxine? Venlafaxine is a selective serotonin and norepinephrine reuptake inhibitor (SNRIs) antidepressant. Venlafaxine affects chemicals in the brain that may be unbalanced in people with depression. Venlafaxine is used to treat major depressive disorder, anxiety, and panic disorder. Venlafaxine may also be used for purposes not listed in this medication guide. What should I discuss with my healthcare provider before taking venlafaxine? You should not take this medicine if you are allergic to venlafaxine or desvenlafaxine (Pristiq).   Do not use venlafaxine within 7 days before or 14 days after you have used an MAO inhibitor, such as isocarboxazid, linezolid, methylene blue injection, phenelzine, rasagiline, selegiline, or tranylcypromine. A dangerous drug interaction could occur. Some medicines can interact with venlafaxine and cause a serious condition called serotonin syndrome. Be sure your doctor knows if you also take stimulant medicine, opioid medicine, herbal products, or medicine for depression, mental illness, Parkinson's disease, migraine headaches, serious infections, or prevention of nausea and vomiting. Ask your doctor before making any changes in how or when you take your medications. Tell your doctor if you have ever had:  · bipolar disorder (manic depression);  · cirrhosis or other liver disease;  · kidney disease;  · heart disease, high blood pressure, high cholesterol;  · diabetes;  · narrow-angle glaucoma;  · a thyroid disorder;  · a history of seizures;  · a bleeding or blood clotting disorder;  · low levels of sodium in your blood; or  · if you are switching to venlafaxine from another antidepressant. Some young people have thoughts about suicide when first taking an antidepressant. Your doctor should check your progress at regular visits. Your family or other caregivers should also be alert to changes in your mood or symptoms. Venlafaxine may cause serious lung problems in a  if the mother takes the medicine late in pregnancy (during the third trimester). However, you may have a relapse of depression if you stop taking your antidepressant. Tell your doctor right away if you become pregnant. Do not start or stop taking venlafaxine during pregnancy without your doctor's advice. You should not breast-feed while using this medicine. Venlafaxine is not approved for use by anyone younger than 25years old. How should I take venlafaxine? Follow all directions on your prescription label and read all medication guides or instruction sheets. Use the medicine exactly as directed. Venlafaxine should be taken with food. Try to take venlafaxine at the same time each day.   Swallow the extended-release restless, hyperactive (mentally or physically), more depressed, or have thoughts about suicide or hurting yourself. Call your doctor at once if you have:  · blurred vision, tunnel vision, eye pain or swelling, or seeing halos around lights;  · easy bruising or bleeding (nosebleeds, bleeding gums), blood in your urine or stools, coughing up blood;  · cough, chest tightness, trouble breathing;  · a seizure (convulsions);  · low sodium level  --headache, confusion, slurred speech, severe weakness, vomiting, loss of coordination, feeling unsteady; or  · severe nervous system reaction --very stiff (rigid) muscles, high fever, sweating, confusion, fast or uneven heartbeats, tremors, feeling like you might pass out. Seek medical attention right away if you have symptoms of serotonin syndrome, such as: agitation, hallucinations, fever, sweating, shivering, fast heart rate, muscle stiffness, twitching, loss of coordination, nausea, vomiting, or diarrhea  Common side effects may include:  · dizziness, drowsiness,  · anxiety, feeling nervous;  · sleep problems (insomnia);  · vision changes;  · nausea, vomiting, diarrhea;  · changes in weight or appetite;  · dry mouth, yawning;  · increased sweating; or  · decreased sex drive, impotence, abnormal ejaculation, difficulty having an orgasm. This is not a complete list of side effects and others may occur. Call your doctor for medical advice about side effects. You may report side effects to FDA at 6-575-FDA-6146. What other drugs will affect venlafaxine? Using venlafaxine with other drugs that make you drowsy can worsen this effect. Ask your doctor before using opioid medication, a sleeping pill, a muscle relaxer, or medicine for anxiety or seizures. Tell your doctor about all your current medicines.  Many drugs can affect venlafaxine, especially:  · any other antidepressant;  · cimetidine;  · Markle's wort;  · tramadol;  · tryptophan (sometimes called L-tryptophan);  · a herein is not intended to cover all possible uses, directions, precautions, warnings, drug interactions, allergic reactions, or adverse effects. If you have questions about the drugs you are taking, check with your doctor, nurse or pharmacist.  Copyright 0189-4886 [address]: 15.01. Revision date: 5/4/2018. Care instructions adapted under license by [facility] ([facility]). If you have questions about a medical condition or this instruction, always ask your healthcare professional. [place] [address] any warranty or liability for your use of this information. Please note a portion of this chart was generated using dragon dictation software. Although every effort was made to ensure the accuracy of this automated transcription, some errors in transcription may have occurred.

## 2020-03-11 NOTE — PROGRESS NOTES
time, general circumstance  Memory: recent and remote memory intact  Attention/Concentration: intact during session  Psychomotor Activity: normal  Eye Contact: normal  Speech: normal rate and volume, well-articulated  Mood: anxious, dysphoric  Affect: congruent, slightly brighter  Perception: within normal limits  Thought Content: within normal limits  Thought Process: logical, coherent and goal-directed  Insight: good  Judgment: intact  Ability to understand instructions: Yes  Ability to respond meaningfully: Yes  Morbid Ideation: passive thoughts of death  Suicide Assessment: no suicidal ideation, plan, or intent  Homicidal Ideation: no      DEBORA 7 SCORE 3/11/2020 2/27/2020 2/3/2020   DEBORA-7 Total Score 12 15 20     Interpretation of DEBORA-7 score: 5-9 = mild anxiety, 10-14 = moderate anxiety, 15+ = severe anxiety. Recommend referral to behavioral health for scores 10 or greater. PHQ Scores 3/11/2020 2/27/2020 2/3/2020 1/24/2020 1/24/2020   PHQ2 Score 4 4 4 - 6   PHQ9 Score 19 16 17 19 6     Interpretation of Total Score Depression Severity: 1-4 = Minimal depression, 5-9 = Mild depression, 10-14 = Moderate depression, 15-19 = Moderately severe depression, 20-27 = Severe depression    Diagnosis:    1. PTSD (post-traumatic stress disorder)        Patient Active Problem List   Diagnosis    Exposure to blood or body fluid    Metabolic syndrome    Irritation of gums and throat    Tinnitus, bilateral         Plan:  Pt interventions:  Supportive techniques, Emphasized self-care as important for managing overall health, CBT to target Balanced thinking, Provided education on the use of medication to treat patient's mental health symptoms and Discussed benefits of referral for specialty care for Trauma-focused psychotherapy. Pt Behavioral Change Plan:  Pt set the following goals:  1. Allow yourself to process the memories of your trauma without avoiding triggers  2. Take your dog for walks to manage stress.  When

## 2020-03-11 NOTE — PATIENT INSTRUCTIONS
1) Call with update on how you are doing with Venlafaxine sometime next week. 2) Keep appt with Alex Villarreal for now; unless you get an appt with someone else. 3) f/u 2 to 4 weeks. Patient Education        venlafaxine  Pronunciation:  BRITTANI la fax een  Brand:  Effexor XR  What is the most important information I should know about venlafaxine? Do not use venlafaxine within 7 days before or 14 days after you have used an MAO inhibitor, such as isocarboxazid, linezolid, methylene blue injection, phenelzine, rasagiline, selegiline, or tranylcypromine. Some young people have thoughts about suicide when first taking an antidepressant. Stay alert to changes in your mood or symptoms. Report any new or worsening symptoms to your doctor. Do not stop using venlafaxine without first talking to your doctor. What is venlafaxine? Venlafaxine is a selective serotonin and norepinephrine reuptake inhibitor (SNRIs) antidepressant. Venlafaxine affects chemicals in the brain that may be unbalanced in people with depression. Venlafaxine is used to treat major depressive disorder, anxiety, and panic disorder. Venlafaxine may also be used for purposes not listed in this medication guide. What should I discuss with my healthcare provider before taking venlafaxine? You should not take this medicine if you are allergic to venlafaxine or desvenlafaxine (Pristiq). Do not use venlafaxine within 7 days before or 14 days after you have used an MAO inhibitor, such as isocarboxazid, linezolid, methylene blue injection, phenelzine, rasagiline, selegiline, or tranylcypromine. A dangerous drug interaction could occur. Some medicines can interact with venlafaxine and cause a serious condition called serotonin syndrome.  Be sure your doctor knows if you also take stimulant medicine, opioid medicine, herbal products, or medicine for depression, mental illness, Parkinson's disease, migraine headaches, serious infections, or prevention of nausea and vomiting. Ask your doctor before making any changes in how or when you take your medications. Tell your doctor if you have ever had:  · bipolar disorder (manic depression);  · cirrhosis or other liver disease;  · kidney disease;  · heart disease, high blood pressure, high cholesterol;  · diabetes;  · narrow-angle glaucoma;  · a thyroid disorder;  · a history of seizures;  · a bleeding or blood clotting disorder;  · low levels of sodium in your blood; or  · if you are switching to venlafaxine from another antidepressant. Some young people have thoughts about suicide when first taking an antidepressant. Your doctor should check your progress at regular visits. Your family or other caregivers should also be alert to changes in your mood or symptoms. Venlafaxine may cause serious lung problems in a  if the mother takes the medicine late in pregnancy (during the third trimester). However, you may have a relapse of depression if you stop taking your antidepressant. Tell your doctor right away if you become pregnant. Do not start or stop taking venlafaxine during pregnancy without your doctor's advice. You should not breast-feed while using this medicine. Venlafaxine is not approved for use by anyone younger than 25years old. How should I take venlafaxine? Follow all directions on your prescription label and read all medication guides or instruction sheets. Use the medicine exactly as directed. Venlafaxine should be taken with food. Try to take venlafaxine at the same time each day. Swallow the extended-release capsule or tablet whole and do not crush, chew, break, or open it. If you cannot swallow a capsule whole, open it and sprinkle the medicine into a spoonful of applesauce. Swallow the mixture right away without chewing. Do not save it for later use. It may take several weeks before your symptoms improve. Keep using the medication as directed.  Do not stop using venlafaxine without products. Not all possible drug interactions are listed here. Where can I get more information? Your pharmacist can provide more information about venlafaxine. Remember, keep this and all other medicines out of the reach of children, never share your medicines with others, and use this medication only for the indication prescribed. Every effort has been made to ensure that the information provided by [address] [place] [address] is accurate, up-to-date, and complete, but no guarantee is made to that effect. Drug information contained herein may be time sensitive. [facility] information has been compiled for use by healthcare practitioners and consumers in the [place] and therefore [facility] does not warrant that uses outside of the [place] are appropriate, unless specifically indicated otherwise. [facility]'s drug information does not endorse drugs, diagnose patients or recommend therapy. [facility]'s drug information is an informational resource designed to assist licensed healthcare practitioners in caring for their patients and/or to serve consumers viewing this service as a supplement to, and not a substitute for, the expertise, skill, knowledge and judgment of healthcare practitioners. The absence of a warning for a given drug or drug combination in no way should be construed to indicate that the drug or drug combination is safe, effective or appropriate for any given patient. [facility] does not assume any responsibility for any aspect of healthcare administered with the aid of information [facility] provides. The information contained herein is not intended to cover all possible uses, directions, precautions, warnings, drug interactions, allergic reactions, or adverse effects. If you have questions about the drugs you are taking, check with your doctor, nurse or pharmacist.  Copyright 3494-6820 Alej[address] [place] [address] Avenue: 15.01. Revision date: 5/4/2018. Care instructions adapted under license by [facility] ([facility]).  If you have questions about a medical condition or this instruction, always ask your healthcare professional. [place] [address] any warranty or liability for your use of this information.

## 2020-03-11 NOTE — PATIENT INSTRUCTIONS
Goals: 1. Allow yourself to process the memories of your trauma without avoiding triggers  2. Take your dog for walks to manage stress. When the anxiety sets in, focus on diaphragmatic breathing and grounding. 3. Practice being mindful - paying attention to the present moment on purpose and in a nonjudgmental way  4. Practice diaphragmatic breathing throughout the day  5. Practice grounding exercises - noticing what your senses are experiencing in the moment  6. Schedule an appointment with a therapist in the community who specializes in working through trauma  7.  Consider taking Effexor before bed if it continues to cause dizziness during the day

## 2020-06-30 RX ORDER — PRAZOSIN HYDROCHLORIDE 2 MG/1
CAPSULE ORAL
Qty: 60 CAPSULE | Refills: 3 | Status: SHIPPED | OUTPATIENT
Start: 2020-06-30 | End: 2021-06-08 | Stop reason: ALTCHOICE

## 2020-07-15 RX ORDER — VENLAFAXINE HYDROCHLORIDE 75 MG/1
CAPSULE, EXTENDED RELEASE ORAL
Qty: 30 CAPSULE | Refills: 3 | Status: SHIPPED | OUTPATIENT
Start: 2020-07-15 | End: 2020-12-18

## 2020-08-31 RX ORDER — ESCITALOPRAM OXALATE 10 MG/1
TABLET ORAL
Qty: 30 TABLET | Refills: 5 | Status: SHIPPED | OUTPATIENT
Start: 2020-08-31 | End: 2021-06-08

## 2020-08-31 NOTE — TELEPHONE ENCOUNTER
Requested Prescriptions     Pending Prescriptions Disp Refills    escitalopram (LEXAPRO) 10 MG tablet [Pharmacy Med Name: ESCITALOPRAM 10 MG TABLET] 30 tablet 5     Sig: TAKE 1 TABLET BY MOUTH EVERY DAY       Lida Eng

## 2020-12-18 RX ORDER — VENLAFAXINE HYDROCHLORIDE 75 MG/1
CAPSULE, EXTENDED RELEASE ORAL
Qty: 30 CAPSULE | Refills: 0 | Status: SHIPPED | OUTPATIENT
Start: 2020-12-18 | End: 2021-01-14

## 2021-01-14 DIAGNOSIS — F43.10 PTSD (POST-TRAUMATIC STRESS DISORDER): ICD-10-CM

## 2021-01-14 RX ORDER — VENLAFAXINE HYDROCHLORIDE 75 MG/1
CAPSULE, EXTENDED RELEASE ORAL
Qty: 30 CAPSULE | Refills: 0 | Status: SHIPPED | OUTPATIENT
Start: 2021-01-14 | End: 2021-02-08

## 2021-02-08 DIAGNOSIS — F43.10 PTSD (POST-TRAUMATIC STRESS DISORDER): ICD-10-CM

## 2021-02-08 RX ORDER — VENLAFAXINE HYDROCHLORIDE 75 MG/1
CAPSULE, EXTENDED RELEASE ORAL
Qty: 30 CAPSULE | Refills: 0 | Status: SHIPPED | OUTPATIENT
Start: 2021-02-08 | End: 2021-06-08 | Stop reason: ALTCHOICE

## 2021-06-08 ENCOUNTER — OFFICE VISIT (OUTPATIENT)
Dept: FAMILY MEDICINE CLINIC | Age: 36
End: 2021-06-08
Payer: MEDICARE

## 2021-06-08 VITALS
DIASTOLIC BLOOD PRESSURE: 64 MMHG | HEART RATE: 89 BPM | SYSTOLIC BLOOD PRESSURE: 126 MMHG | BODY MASS INDEX: 53.78 KG/M2 | TEMPERATURE: 97.1 F | WEIGHT: 315 LBS | RESPIRATION RATE: 16 BRPM | OXYGEN SATURATION: 97 % | HEIGHT: 64 IN

## 2021-06-08 DIAGNOSIS — E66.01 OBESITY, MORBID (MORE THAN 100 LBS OVER IDEAL WEIGHT OR BMI > 40) (HCC): ICD-10-CM

## 2021-06-08 DIAGNOSIS — Z00.00 ROUTINE GENERAL MEDICAL EXAMINATION AT A HEALTH CARE FACILITY: Primary | ICD-10-CM

## 2021-06-08 PROCEDURE — 99395 PREV VISIT EST AGE 18-39: CPT | Performed by: FAMILY MEDICINE

## 2021-06-08 SDOH — ECONOMIC STABILITY: FOOD INSECURITY: WITHIN THE PAST 12 MONTHS, YOU WORRIED THAT YOUR FOOD WOULD RUN OUT BEFORE YOU GOT MONEY TO BUY MORE.: NEVER TRUE

## 2021-06-08 SDOH — ECONOMIC STABILITY: FOOD INSECURITY: WITHIN THE PAST 12 MONTHS, THE FOOD YOU BOUGHT JUST DIDN'T LAST AND YOU DIDN'T HAVE MONEY TO GET MORE.: NEVER TRUE

## 2021-06-08 SDOH — ECONOMIC STABILITY: TRANSPORTATION INSECURITY
IN THE PAST 12 MONTHS, HAS LACK OF TRANSPORTATION KEPT YOU FROM MEETINGS, WORK, OR FROM GETTING THINGS NEEDED FOR DAILY LIVING?: NO

## 2021-06-08 SDOH — ECONOMIC STABILITY: TRANSPORTATION INSECURITY
IN THE PAST 12 MONTHS, HAS THE LACK OF TRANSPORTATION KEPT YOU FROM MEDICAL APPOINTMENTS OR FROM GETTING MEDICATIONS?: NO

## 2021-06-08 ASSESSMENT — PATIENT HEALTH QUESTIONNAIRE - PHQ9
SUM OF ALL RESPONSES TO PHQ QUESTIONS 1-9: 0
SUM OF ALL RESPONSES TO PHQ9 QUESTIONS 1 & 2: 0
SUM OF ALL RESPONSES TO PHQ QUESTIONS 1-9: 0
1. LITTLE INTEREST OR PLEASURE IN DOING THINGS: 0
SUM OF ALL RESPONSES TO PHQ QUESTIONS 1-9: 0
2. FEELING DOWN, DEPRESSED OR HOPELESS: 0

## 2021-06-08 ASSESSMENT — SOCIAL DETERMINANTS OF HEALTH (SDOH): HOW HARD IS IT FOR YOU TO PAY FOR THE VERY BASICS LIKE FOOD, HOUSING, MEDICAL CARE, AND HEATING?: NOT HARD AT ALL

## 2021-06-08 NOTE — PATIENT INSTRUCTIONS
You are welcome to call to make an appointment to meet with the weight loss physician. If you decide to not consult with weight loss physician then your primary care doctor does recommend you get the lab work done. --You can get your lab work or x-ray done at [address] LAB: [address]  Lab hours (7AM - 5PM Monday through Friday; 8AM - noon on Saturdays),   Ph# [phone]    X-RAY: [address]  Radiology hours, (7:00AM - 5PM Monday through Friday only)  Ph# [phone] or 38-[facility]  --Call our office in 1 week if you have not heard about the results. Or check CRAVE if you have previously enrolled. Your primary care physician believes that you are experiencing plantar fasciitis of the right foot. Review information on handout. Consider daily contrast bath for approximately 12 to 15 minutes a day. Try the night splint also for about 1 to 2 weeks. The anti-inflammatory gel called Voltaren also known as diclofenac gel is available at most places such as [facility] or [employer] or [employer]. Apply that to the bottom of the foot at least morning and evening ideally morning midday and evening. If you are not experiencing significant improvement or resolution in 10 to 14 days call your primary care doctor or send him a CRAVE message      Patient Education        Plantar Fasciitis: Care Instructions  Overview     Plantar fasciitis is pain and inflammation of the plantar fascia, the tissue at the bottom of your foot that connects the heel bone to the toes. The plantar fascia also supports the arch. If you strain the plantar fascia, it can develop small tears and cause heel pain when you stand or walk. Plantar fasciitis can be caused by running or other sports. It also may occur in people who are overweight or who have high arches or flat feet. You may get plantar fasciitis if you walk or stand for long periods, or have a tight Achilles tendon or calf muscles.   You can improve your foot pain with rest and other care at home. It might take a few weeks to a few months for your foot to heal completely. Follow-up care is a key part of your treatment and safety. Be sure to make and go to all appointments, and call your doctor if you are having problems. It's also a good idea to know your test results and keep a list of the medicines you take. How can you care for yourself at home? · Rest your feet often. Reduce your activity to a level that lets you avoid pain. If possible, do not run or walk on hard surfaces. · Take pain medicines exactly as directed. ? If the doctor gave you a prescription medicine for pain, take it as prescribed. ? If you are not taking a prescription pain medicine, take an over-the-counter anti-inflammatory medicine for pain and swelling, such as ibuprofen (Advil, Motrin) or naproxen (Aleve). Read and follow all instructions on the label. · Use ice massage to help with pain and swelling. You can use an ice cube or an ice cup several times a day. To make an ice cup, fill a paper cup with water and freeze it. Cut off the top of the cup until a half-inch of ice shows. Hold onto the remaining paper to use the cup. Rub the ice in small circles over the area for 5 to 7 minutes. · Contrast baths, which alternate hot and cold water, can also help reduce swelling. But because heat alone may make pain and swelling worse, end a contrast bath with a soak in cold water. · Wear a night splint if your doctor suggests it. A night splint holds your foot with the toes pointed up and the foot and ankle at a 90-degree angle. This position gives the bottom of your foot a constant, gentle stretch. · Do simple exercises such as calf stretches and towel stretches 2 to 3 times each day, especially when you first get up in the morning. These can help the plantar fascia become more flexible. They also make the muscles that support your arch stronger.  Hold these stretches for 15 to 30 seconds per stretch. Repeat 2 to 4 times. ? Stand about 1 foot from a wall. Place the palms of both hands against the wall at chest level. Lean forward against the wall, keeping one leg with the knee straight and heel on the ground while bending the knee of the other leg.  ? Sit down on the floor or a mat with your feet stretched in front of you. Roll up a towel lengthwise, and loop it over the ball of your foot. Holding the towel at both ends, gently pull the towel toward you to stretch your foot. · Wear shoes with good arch support. Athletic shoes or shoes with a well-cushioned sole are good choices. · Replace athletic shoes regularly. · Try heel cups or shoe inserts (orthotics) to help cushion your heel. You can buy these at many shoe stores. · Put on your shoes as soon as you get out of bed. Going barefoot or wearing slippers may make your pain worse. · Reach and stay at a good weight for your height. This puts less strain on your feet. When should you call for help? Call your doctor now or seek immediate medical care if:    · You have heel pain with fever, redness, or warmth in your heel.     · You cannot put weight on the sore foot. Watch closely for changes in your health, and be sure to contact your doctor if:    · You have numbness or tingling in your heel.     · Your heel pain lasts more than 2 weeks. Where can you learn more? Go to https://KeVitapemanInVenture.Yotta280. org and sign in to your Cellufun account. Enter F572 in the [facility] box to learn more about \"Plantar Fasciitis: Care Instructions. \"     If you do not have an account, please click on the \"Sign Up Now\" link. Current as of: November 16, 2020               Content Version: 12.8  © 6005-9371 Healthwise, Incorporated. Care instructions adapted under license by [facility] ([facility]).  If you have questions about a medical condition or this instruction, always ask your healthcare professional. Samuel Grossman disclaims any warranty or liability for your use of this information. Patient Education        Plantar Fasciitis: Care Instructions  Overview     Plantar fasciitis is pain and inflammation of the plantar fascia, the tissue at the bottom of your foot that connects the heel bone to the toes. The plantar fascia also supports the arch. If you strain the plantar fascia, it can develop small tears and cause heel pain when you stand or walk. Plantar fasciitis can be caused by running or other sports. It also may occur in people who are overweight or who have high arches or flat feet. You may get plantar fasciitis if you walk or stand for long periods, or have a tight Achilles tendon or calf muscles. You can improve your foot pain with rest and other care at home. It might take a few weeks to a few months for your foot to heal completely. Follow-up care is a key part of your treatment and safety. Be sure to make and go to all appointments, and call your doctor if you are having problems. It's also a good idea to know your test results and keep a list of the medicines you take. How can you care for yourself at home? · Rest your feet often. Reduce your activity to a level that lets you avoid pain. If possible, do not run or walk on hard surfaces. · Take pain medicines exactly as directed. ? If the doctor gave you a prescription medicine for pain, take it as prescribed. ? If you are not taking a prescription pain medicine, take an over-the-counter anti-inflammatory medicine for pain and swelling, such as ibuprofen (Advil, Motrin) or naproxen (Aleve). Read and follow all instructions on the label. · Use ice massage to help with pain and swelling. You can use an ice cube or an ice cup several times a day. To make an ice cup, fill a paper cup with water and freeze it. Cut off the top of the cup until a half-inch of ice shows. Hold onto the remaining paper to use the cup.  Rub the ice in small circles over the area for 5 to 7 minutes. · Contrast baths, which alternate hot and cold water, can also help reduce swelling. But because heat alone may make pain and swelling worse, end a contrast bath with a soak in cold water. · Wear a night splint if your doctor suggests it. A night splint holds your foot with the toes pointed up and the foot and ankle at a 90-degree angle. This position gives the bottom of your foot a constant, gentle stretch. · Do simple exercises such as calf stretches and towel stretches 2 to 3 times each day, especially when you first get up in the morning. These can help the plantar fascia become more flexible. They also make the muscles that support your arch stronger. Hold these stretches for 15 to 30 seconds per stretch. Repeat 2 to 4 times. ? Stand about 1 foot from a wall. Place the palms of both hands against the wall at chest level. Lean forward against the wall, keeping one leg with the knee straight and heel on the ground while bending the knee of the other leg.  ? Sit down on the floor or a mat with your feet stretched in front of you. Roll up a towel lengthwise, and loop it over the ball of your foot. Holding the towel at both ends, gently pull the towel toward you to stretch your foot. · Wear shoes with good arch support. Athletic shoes or shoes with a well-cushioned sole are good choices. · Replace athletic shoes regularly. · Try heel cups or shoe inserts (orthotics) to help cushion your heel. You can buy these at many shoe stores. · Put on your shoes as soon as you get out of bed. Going barefoot or wearing slippers may make your pain worse. · Reach and stay at a good weight for your height. This puts less strain on your feet. When should you call for help? Call your doctor now or seek immediate medical care if:    · You have heel pain with fever, redness, or warmth in your heel.     · You cannot put weight on the sore foot.    Watch closely for changes in your health, and be sure to

## 2021-06-08 NOTE — PROGRESS NOTES
[facility]  Progress Note  Ricci CornejoDO Charbel mendoza  1985 06/08/21    Chief Complaint:   Charbel Bishop is a 38 y.o. male who is here for yearly checkup        HPI:   Experiencing chronic right foot pain. Located in the midfoot and hindfoot. Was successful with weight loss last year losing almost 50 pounds. Unfortunately with the pandemic and the Covid restrictions he ended up gaining the weight back. He does have a sister that had successful weight loss with bariatric surgery. Experiences tinnitus intermittently. Finding that he does not need the anxiolytics at this time. ROS negative for headache, visionchanges, chest pain, shortness of breath, abdominal pain, urinary sx, bowel changes. Past medical, surgical, and social history reviewed. and allergies reviewed. No Known Allergies  Prior to Visit Medications    Not on File          Vitals:    06/08/21 1400   BP: 126/64   Pulse: 89   Resp: 16   Temp: 97.1 °F (36.2 °C)   TempSrc: Tympanic   SpO2: 97%   Weight: (!) 348 lb (157.9 kg)   Height: 5' 4\" (1.626 m)      Wt Readings from Last 3 Encounters:   06/08/21 (!) 348 lb (157.9 kg)   03/11/20 (!) 332 lb (150.6 kg)   03/03/20 (!) 332 lb (150.6 kg)     BP Readings from Last 3 Encounters:   06/08/21 126/64   03/11/20 120/65   03/03/20 113/71       Patient Active Problem List   Diagnosis    Exposure to blood or body fluid    Metabolic syndrome    Irritation of gums and throat    Tinnitus, bilateral       Immunization History   Administered Date(s) Administered    Hepatitis B (Engerix-B) 02/13/2016, 03/31/2016    Hepatitis B (Recombivax HB) 08/04/2016    Hepatitis B Ped/Adol (Engerix-B, Recombivax HB) 02/13/2016    Tdap (Boostrix, Adacel) 03/03/2020       Past Medical History:   Diagnosis Date    Acid reflux      No past surgical history on file.   Family History   Problem Relation Age of Onset    Diabetes Mother     Hypertension Sister Social History     Socioeconomic History    Marital status: Single     Spouse name: Not on file    Number of children: Not on file    Years of education: Not on file    Highest education level: Not on file   Occupational History    Not on file   Tobacco Use    Smoking status: Never Smoker    Smokeless tobacco: Never Used   Substance and Sexual Activity    Alcohol use: Never    Drug use: Never    Sexual activity: Not on file   Other Topics Concern    Not on file   Social History Narrative    Not on file     Social Determinants of Health     Financial Resource Strain: Low Risk     Difficulty of Paying Living Expenses: Not hard at all   Food Insecurity: No Food Insecurity    Worried About Running Out of Food in the Last Year: Never true    [address] in the Last Year: Never true   Transportation Needs: No Transportation Needs    Lack of Transportation (Medical): No    Lack of Transportation (Non-Medical): No   Physical Activity:     Days of Exercise per Week:     Minutes of Exercise per Session:    Stress:     Feeling of Stress :    Social Connections:     Frequency of Communication with Friends and Family:     Frequency of Social Gatherings with Friends and Family:     Attends [employer] Services:     Active Member of Clubs or Organizations:     Attends Club or Organization Meetings:     Marital Status:    Intimate Partner Violence:     Fear of Current or Ex-Partner:     Emotionally Abused:     Physically Abused:     Sexually Abused:        O: /64   Pulse 89   Temp 97.1 °F (36.2 °C) (Tympanic)   Resp 16   Ht 5' 4\" (1.626 m)   Wt (!) 348 lb (157.9 kg)   SpO2 97%   BMI 59.73 kg/m²   Physical Exam  GEN: No acute distress,cooperative, well nourished, alert. HEENT: PEERLA, EOMI , normocephalic/atraumatic, external nose appears normal.  External ear is normal.    Neck: soft, supple, no appreciable thyromegaly,mass  CV: No upper extremity edema. Resp:  Breathing comfortably. Psych:normal affect. Neuro: AOx3  Other Pertinent Physical Exam findings:   Heart: Normal S1 and S2 with regular rhythm. Lungs: Clear to auscultation bilaterally. Abd: No tenderness to palpation. There is mild tenderness to the plantar fascia of the right foot. ASSESSMENT   Diagnosis Orders   1. Routine general medical examination at a health care facility  COMPREHENSIVE METABOLIC PANEL    LIPID PANEL    TSH with Reflex   2. Obesity, morbid (more than 100 lbs over ideal weight or BMI > 40) ([facility]  Iva Figueroa MD, Bariatric Surgery, [facility]          Time spent on encounter (including any number of the following: review of labs, imaging, provider notes, outside hospital records; performing examination/evaluation; counseling patient and family; ordering medications/tests; placing referrals and communication with referring physicians; coordination of care, and documentation in the EHR): 36 minutes  Established E/M: 10-19 (89656), 20-29 (43913), 30-39 (11218), 40-54 (38352)   New E/M: 15-29 (01844), 30-44 (60103), 45-59 (75260), 60-74 (74649)  Telephone E/M: 5-10 (Freever.[email or web address] SecurityggFoodyn), 11-20 (49021), 21-30 (28095)    If applicable, see additional patient information and instructions under \"Patient Instructions. \"    Return in about 1 year (around 6/8/2022) for Wellness/Health Maintenance. Patient Instructions     You are welcome to call to make an appointment to meet with the weight loss physician. If you decide to not consult with weight loss physician then your primary care doctor does recommend you get the lab work done. --You can get your lab work or x-ray done at [address] [facility] LAB: [address]  Lab hours (7AM - 5PM Monday through Friday; 8AM - noon on Saturdays),   Ph# [phone]    X-RAY: [address]  Radiology hours, (7:00AM - 5PM Monday through Friday only)  Ph# [phone] or [phone]-[facility]  --Call our office in 1 week if you have not heard about the results.  Or If the doctor gave you a prescription medicine for pain, take it as prescribed. ? If you are not taking a prescription pain medicine, take an over-the-counter anti-inflammatory medicine for pain and swelling, such as ibuprofen (Advil, Motrin) or naproxen (Aleve). Read and follow all instructions on the label. · Use ice massage to help with pain and swelling. You can use an ice cube or an ice cup several times a day. To make an ice cup, fill a paper cup with water and freeze it. Cut off the top of the cup until a half-inch of ice shows. Hold onto the remaining paper to use the cup. Rub the ice in small circles over the area for 5 to 7 minutes. · Contrast baths, which alternate hot and cold water, can also help reduce swelling. But because heat alone may make pain and swelling worse, end a contrast bath with a soak in cold water. · Wear a night splint if your doctor suggests it. A night splint holds your foot with the toes pointed up and the foot and ankle at a 90-degree angle. This position gives the bottom of your foot a constant, gentle stretch. · Do simple exercises such as calf stretches and towel stretches 2 to 3 times each day, especially when you first get up in the morning. These can help the plantar fascia become more flexible. They also make the muscles that support your arch stronger. Hold these stretches for 15 to 30 seconds per stretch. Repeat 2 to 4 times. ? Stand about 1 foot from a wall. Place the palms of both hands against the wall at chest level. Lean forward against the wall, keeping one leg with the knee straight and heel on the ground while bending the knee of the other leg.  ? Sit down on the floor or a mat with your feet stretched in front of you. Roll up a towel lengthwise, and loop it over the ball of your foot. Holding the towel at both ends, gently pull the towel toward you to stretch your foot. · Wear shoes with good arch support.  Athletic shoes or shoes with a well-cushioned sole are good choices. · Replace athletic shoes regularly. · Try heel cups or shoe inserts (orthotics) to help cushion your heel. You can buy these at many shoe stores. · Put on your shoes as soon as you get out of bed. Going barefoot or wearing slippers may make your pain worse. · Reach and stay at a good weight for your height. This puts less strain on your feet. When should you call for help? Call your doctor now or seek immediate medical care if:    · You have heel pain with fever, redness, or warmth in your heel.     · You cannot put weight on the sore foot. Watch closely for changes in your health, and be sure to contact your doctor if:    · You have numbness or tingling in your heel.     · Your heel pain lasts more than 2 weeks. Where can you learn more? Go to https://WengopeIndexing.Xpliant. org and sign in to your Decibel Music Systems account. Enter V445 in the [employer] box to learn more about \"Plantar Fasciitis: Care Instructions. \"     If you do not have an account, please click on the \"Sign Up Now\" link. Current as of: November 16, 2020               Content Version: 12.8  © 2006-2021 [employer]kn[employer]. Care instructions adapted under license by [facility] ([facility]). If you have questions about a medical condition or this instruction, always ask your healthcare professional. [place] [address] any warranty or liability for your use of this information. Patient Education        Plantar Fasciitis: Care Instructions  Overview     Plantar fasciitis is pain and inflammation of the plantar fascia, the tissue at the bottom of your foot that connects the heel bone to the toes. The plantar fascia also supports the arch. If you strain the plantar fascia, it can develop small tears and cause heel pain when you stand or walk. Plantar fasciitis can be caused by running or other sports. It also may occur in people who are overweight or who have high arches or flat feet.  You may get plantar fasciitis if you walk or stand for long periods, or have a tight Achilles tendon or calf muscles. You can improve your foot pain with rest and other care at home. It might take a few weeks to a few months for your foot to heal completely. Follow-up care is a key part of your treatment and safety. Be sure to make and go to all appointments, and call your doctor if you are having problems. It's also a good idea to know your test results and keep a list of the medicines you take. How can you care for yourself at home? · Rest your feet often. Reduce your activity to a level that lets you avoid pain. If possible, do not run or walk on hard surfaces. · Take pain medicines exactly as directed. ? If the doctor gave you a prescription medicine for pain, take it as prescribed. ? If you are not taking a prescription pain medicine, take an over-the-counter anti-inflammatory medicine for pain and swelling, such as ibuprofen (Advil, Motrin) or naproxen (Aleve). Read and follow all instructions on the label. · Use ice massage to help with pain and swelling. You can use an ice cube or an ice cup several times a day. To make an ice cup, fill a paper cup with water and freeze it. Cut off the top of the cup until a half-inch of ice shows. Hold onto the remaining paper to use the cup. Rub the ice in small circles over the area for 5 to 7 minutes. · Contrast baths, which alternate hot and cold water, can also help reduce swelling. But because heat alone may make pain and swelling worse, end a contrast bath with a soak in cold water. · Wear a night splint if your doctor suggests it. A night splint holds your foot with the toes pointed up and the foot and ankle at a 90-degree angle. This position gives the bottom of your foot a constant, gentle stretch. · Do simple exercises such as calf stretches and towel stretches 2 to 3 times each day, especially when you first get up in the morning.  These can help the plantar fascia become more flexible. They also make the muscles that support your arch stronger. Hold these stretches for 15 to 30 seconds per stretch. Repeat 2 to 4 times. ? Stand about 1 foot from a wall. Place the palms of both hands against the wall at chest level. Lean forward against the wall, keeping one leg with the knee straight and heel on the ground while bending the knee of the other leg.  ? Sit down on the floor or a mat with your feet stretched in front of you. Roll up a towel lengthwise, and loop it over the ball of your foot. Holding the towel at both ends, gently pull the towel toward you to stretch your foot. · Wear shoes with good arch support. Athletic shoes or shoes with a well-cushioned sole are good choices. · Replace athletic shoes regularly. · Try heel cups or shoe inserts (orthotics) to help cushion your heel. You can buy these at many shoe stores. · Put on your shoes as soon as you get out of bed. Going barefoot or wearing slippers may make your pain worse. · Reach and stay at a good weight for your height. This puts less strain on your feet. When should you call for help? Call your doctor now or seek immediate medical care if:    · You have heel pain with fever, redness, or warmth in your heel.     · You cannot put weight on the sore foot. Watch closely for changes in your health, and be sure to contact your doctor if:    · You have numbness or tingling in your heel.     · Your heel pain lasts more than 2 weeks. Where can you learn more? Go to https://Aria InnovationspeSamba Networks.Zhijiang Jonway Automobile. org and sign in to your BizBrag account. Enter P493 in the [facility] box to learn more about \"Plantar Fasciitis: Care Instructions. \"     If you do not have an account, please click on the \"Sign Up Now\" link. Current as of: November 16, 2020               Content Version: 12.8  © 5922-6679 Healthwise, Incorporated. Care instructions adapted under license by [facility] ([facility]).  If you have questions about a medical condition or this instruction, always ask your healthcare professional. [address] any warranty or liability for your use of this information. Please note a portion of this chart was generated using dragon dictation software. Although every effort was made to ensure the accuracy of this automated transcription,some errors in transcription may have occurred.